# Patient Record
Sex: MALE | Race: OTHER | NOT HISPANIC OR LATINO | URBAN - METROPOLITAN AREA
[De-identification: names, ages, dates, MRNs, and addresses within clinical notes are randomized per-mention and may not be internally consistent; named-entity substitution may affect disease eponyms.]

---

## 2018-06-16 ENCOUNTER — EMERGENCY (EMERGENCY)
Facility: HOSPITAL | Age: 68
LOS: 1 days | Discharge: HOME | End: 2018-06-16
Attending: EMERGENCY MEDICINE | Admitting: EMERGENCY MEDICINE
Payer: MEDICARE

## 2018-06-16 VITALS
RESPIRATION RATE: 18 BRPM | TEMPERATURE: 98 F | SYSTOLIC BLOOD PRESSURE: 147 MMHG | OXYGEN SATURATION: 96 % | DIASTOLIC BLOOD PRESSURE: 106 MMHG | HEART RATE: 94 BPM

## 2018-06-16 LAB
ALBUMIN SERPL ELPH-MCNC: 4.5 G/DL — SIGNIFICANT CHANGE UP (ref 3.3–5)
ALP SERPL-CCNC: 71 U/L — SIGNIFICANT CHANGE UP (ref 40–120)
ALT FLD-CCNC: 17 U/L — SIGNIFICANT CHANGE UP (ref 4–41)
AST SERPL-CCNC: 19 U/L — SIGNIFICANT CHANGE UP (ref 4–40)
BASOPHILS # BLD AUTO: 0.03 K/UL — SIGNIFICANT CHANGE UP (ref 0–0.2)
BASOPHILS NFR BLD AUTO: 0.2 % — SIGNIFICANT CHANGE UP (ref 0–2)
BASOPHILS NFR SPEC: 0 % — SIGNIFICANT CHANGE UP (ref 0–2)
BILIRUB SERPL-MCNC: 0.8 MG/DL — SIGNIFICANT CHANGE UP (ref 0.2–1.2)
BUN SERPL-MCNC: 11 MG/DL — SIGNIFICANT CHANGE UP (ref 7–23)
CALCIUM SERPL-MCNC: 9.8 MG/DL — SIGNIFICANT CHANGE UP (ref 8.4–10.5)
CHLORIDE SERPL-SCNC: 87 MMOL/L — LOW (ref 98–107)
CO2 SERPL-SCNC: 27 MMOL/L — SIGNIFICANT CHANGE UP (ref 22–31)
CREAT SERPL-MCNC: 0.89 MG/DL — SIGNIFICANT CHANGE UP (ref 0.5–1.3)
EOSINOPHIL # BLD AUTO: 0.04 K/UL — SIGNIFICANT CHANGE UP (ref 0–0.5)
EOSINOPHIL NFR BLD AUTO: 0.3 % — SIGNIFICANT CHANGE UP (ref 0–6)
EOSINOPHIL NFR FLD: 0 % — SIGNIFICANT CHANGE UP (ref 0–6)
GIANT PLATELETS BLD QL SMEAR: PRESENT — SIGNIFICANT CHANGE UP
GLUCOSE SERPL-MCNC: 127 MG/DL — HIGH (ref 70–99)
HCT VFR BLD CALC: 42.4 % — SIGNIFICANT CHANGE UP (ref 39–50)
HGB BLD-MCNC: 14.7 G/DL — SIGNIFICANT CHANGE UP (ref 13–17)
HYPOCHROMIA BLD QL: SLIGHT — SIGNIFICANT CHANGE UP
IMM GRANULOCYTES # BLD AUTO: 0.05 # — SIGNIFICANT CHANGE UP
IMM GRANULOCYTES NFR BLD AUTO: 0.4 % — SIGNIFICANT CHANGE UP (ref 0–1.5)
LYMPHOCYTES # BLD AUTO: 1.35 K/UL — SIGNIFICANT CHANGE UP (ref 1–3.3)
LYMPHOCYTES # BLD AUTO: 10.4 % — LOW (ref 13–44)
LYMPHOCYTES NFR SPEC AUTO: 7.8 % — LOW (ref 13–44)
MCHC RBC-ENTMCNC: 30.6 PG — SIGNIFICANT CHANGE UP (ref 27–34)
MCHC RBC-ENTMCNC: 34.7 % — SIGNIFICANT CHANGE UP (ref 32–36)
MCV RBC AUTO: 88.3 FL — SIGNIFICANT CHANGE UP (ref 80–100)
MONOCYTES # BLD AUTO: 1.88 K/UL — HIGH (ref 0–0.9)
MONOCYTES NFR BLD AUTO: 14.5 % — HIGH (ref 2–14)
MONOCYTES NFR BLD: 6.1 % — SIGNIFICANT CHANGE UP (ref 2–9)
NEUTROPHIL AB SER-ACNC: 73.1 % — SIGNIFICANT CHANGE UP (ref 43–77)
NEUTROPHILS # BLD AUTO: 9.59 K/UL — HIGH (ref 1.8–7.4)
NEUTROPHILS NFR BLD AUTO: 74.2 % — SIGNIFICANT CHANGE UP (ref 43–77)
NEUTS BAND # BLD: 1.7 % — SIGNIFICANT CHANGE UP (ref 0–6)
NRBC # FLD: 0 — SIGNIFICANT CHANGE UP
PLATELET # BLD AUTO: 182 K/UL — SIGNIFICANT CHANGE UP (ref 150–400)
PLATELET COUNT - ESTIMATE: NORMAL — SIGNIFICANT CHANGE UP
PMV BLD: 9.7 FL — SIGNIFICANT CHANGE UP (ref 7–13)
POIKILOCYTOSIS BLD QL AUTO: SLIGHT — SIGNIFICANT CHANGE UP
POTASSIUM SERPL-MCNC: 3.5 MMOL/L — SIGNIFICANT CHANGE UP (ref 3.5–5.3)
POTASSIUM SERPL-SCNC: 3.5 MMOL/L — SIGNIFICANT CHANGE UP (ref 3.5–5.3)
PROT SERPL-MCNC: 8.4 G/DL — HIGH (ref 6–8.3)
RBC # BLD: 4.8 M/UL — SIGNIFICANT CHANGE UP (ref 4.2–5.8)
RBC # FLD: 12.1 % — SIGNIFICANT CHANGE UP (ref 10.3–14.5)
REVIEW TO FOLLOW: YES — SIGNIFICANT CHANGE UP
SODIUM SERPL-SCNC: 132 MMOL/L — LOW (ref 135–145)
VARIANT LYMPHS # BLD: 11.3 % — SIGNIFICANT CHANGE UP
WBC # BLD: 12.94 K/UL — HIGH (ref 3.8–10.5)
WBC # FLD AUTO: 12.94 K/UL — HIGH (ref 3.8–10.5)

## 2018-06-16 PROCEDURE — 70487 CT MAXILLOFACIAL W/DYE: CPT | Mod: 26

## 2018-06-16 PROCEDURE — 99283 EMERGENCY DEPT VISIT LOW MDM: CPT | Mod: 25

## 2018-06-16 RX ORDER — ACETAMINOPHEN 500 MG
650 TABLET ORAL ONCE
Qty: 0 | Refills: 0 | Status: COMPLETED | OUTPATIENT
Start: 2018-06-16 | End: 2018-06-16

## 2018-06-16 RX ADMIN — Medication 100 MILLIGRAM(S): at 20:41

## 2018-06-16 RX ADMIN — Medication 650 MILLIGRAM(S): at 20:41

## 2018-06-16 RX ADMIN — Medication 650 MILLIGRAM(S): at 18:13

## 2018-06-16 NOTE — ED PROVIDER NOTE - OBJECTIVE STATEMENT
68 y/o M with a PMHx of HDL and HTN presents to the ED complaining of R sided dental pain. Patient had his wisdom tooth removed 2 weeks ago and about 10 days ago patient says he felt a "nudge" and had trouble opening his mouth. Patient tried Tylenol with no significant improvement. Patient follow back with Dentist because he was afraid that a piece of tooth ws left in but findings were negative. Patient is currently managed on clindamycin 150 mg 4x a day.  Denies bleeding gums, fever, headaches or any other relating symptoms. 66 y/o M with a PMHx of HDL and HTN presents to the ED complaining of R sided dental pain. Patient had his wisdom tooth removed 5weeks ago and about 10 days ago patient says he felt a "nudge" and had trouble opening his mouth. Patient tried Tylenol with no significant improvement. Patient followed up with Dentist because he was afraid that a piece of tooth was left in but findings were negative. Patient is currently managed on clindamycin 150 mg 4x a day.  Denies bleeding gums, fever, headaches or any other relating symptoms. had chills, 2 days ago.

## 2018-06-16 NOTE — ED ADULT NURSE NOTE - OBJECTIVE STATEMENT
rec'd pt. a&ox3, with hx of HLD and HTN, came in c/o rt facial swelling and pain x few days s/p rt tooth extraction 5 weeks ago. pt. also reports intermittent fever for the past few days and was started Clinda at home. noted g20 saline lock on rt ac with no ss of infiltration. pt. in NAD, currently speaks clearly, no drooling noted, able to open mouth but with minimal pain at this time. pt. seen by MD and Dental Resident. awaits CT results. Clindamycin IVPB started as ordered. will continue to monitor

## 2018-06-16 NOTE — ED PROVIDER NOTE - PROGRESS NOTE DETAILS
TERRY Jones: Pt signed out to me by TERRY Raza - consulted OMFS for evaluation of abscess. MD CHO:  I received s/o on this pt from Dr. Bloch.  OMFS to drain oral abscess, cdu for iv abx.

## 2018-06-16 NOTE — PROGRESS NOTE ADULT - SUBJECTIVE AND OBJECTIVE BOX
Patient is a 67y old  Male who presents with a chief complaint of right facial swelling s/p extraction 5 weeks ago.    HPI: patient endorses having a maxillary right "wisdom tooth" extracted 5 weeks ago; patient endorses persistent pain and swelling; patient was prescribed clindamycin.      PAST MEDICAL & SURGICAL HISTORY:  Hypertension, unspecified type  No significant past surgical history      MEDICATIONS  (STANDING):    MEDICATIONS  (PRN):      Allergies    aspirin (Unknown)  penicillin (Unknown)    *SOCIAL HISTORY: presents with wife.    *Last Dental Visit:    Vital Signs Last 24 Hrs  T(C): 36.7 (16 Jun 2018 17:12), Max: 36.7 (16 Jun 2018 17:12)  T(F): 98.1 (16 Jun 2018 17:12), Max: 98.1 (16 Jun 2018 17:12)  HR: 94 (16 Jun 2018 17:12) (94 - 94)  BP: 147/106 (16 Jun 2018 17:12) (147/106 - 147/106)  BP(mean): --  RR: 18 (16 Jun 2018 17:12) (18 - 18)  SpO2: 96% (16 Jun 2018 17:12) (96% - 96%)    EOE:  TMJ (  - ) clicks                    (   - ) pops                    (   - ) crepitus             (  + ) trismus - patient able to open to 20mm, not guarding.             (  - ) LAD             (  + ) swelling - right buccal extending posteriorly to posterior aspect of ramus.             (  + ) asymmetry - right buccal extending posteriorly to posterior aspect of ramus.             (  + ) palpation - right buccal and right submandible.             (  - ) SOB             (  - ) dysphagia - patient able to eat and swallow food, difficulty is with opening.    IOE:  permanent dentition: multiple missing teeth           hard/soft palate:  (  - ) palatal torus - right soft palate minimally displaced.           tongue/FOM No pathology noted           labial/buccal mucosa - right buccal fullness    right sextant evaluated as follows:  1) no vestibular swelling on maxilla or mandible  2) no pain upon palpation of gingiva or percussion of dentition  3) maxillary right posterior alveolus healed  4) right buccal unable to appreciate salivary flow upon milking of parotid    LABS:                        14.7   12.94 )-----------( 182      ( 16 Jun 2018 18:11 )             42.4     06-16    132<L>  |  87<L>  |  11  ----------------------------<  127<H>  3.5   |  27  |  0.89    Ca    9.8      16 Jun 2018 18:11    TPro  8.4<H>  /  Alb  4.5  /  TBili  0.8  /  DBili  x   /  AST  19  /  ALT  17  /  AlkPhos  71  06-16    WBC Count: 12.94 K/uL <H> [3.8 - 10.5] (06-16 @ 18:11)  Platelet Count - Automated: 182 K/uL [150 - 400] (06-16 @ 18:11)      *DENTAL RADIOGRAPHS: panorex taken - asymmetric condyles; sinus floors appear intact and pneumatized; bilateral radiopacities suggestive of vascular calcifications; retained and carious #16; maxillary right without retention cyst or periapical radiolucency.     RADIOLOGY & ADDITIONAL STUDIES:     ASSESSMENT: suspicious for parotitis/ obstruction of stensons duct, recommending CT with contrast.    PROCEDURE:  Verbal consent given.   EOE.  IOE.  Radiograph.    RECOMMENDATIONS:  1) CT with contrast.  2) Dental F/U with outpatient dentist for comprehensive dental care.   3) If any difficulty swallowing/breathing, fever occur, page dental.    Pelon Hudson DDS; [id number].  Won Marroquin, DMD Patient is a 67y old  Male who presents with a chief complaint of right facial swelling s/p extraction 5 weeks ago.    HPI: patient endorses having a maxillary right "wisdom tooth" extracted 5 weeks ago; patient endorses persistent pain, swelling, and fever started this past Thursday ; patient was prescribed clindamycin 150mg 4Xs a day. Pt reports afebrile starting Friday.     PAST MEDICAL & SURGICAL HISTORY:  Hypertension, unspecified type  No significant past surgical history    MEDICATIONS  (STANDING): Clindamycin    MEDICATIONS  (PRN):    Allergies  aspirin (Unknown)  penicillin (Unknown)    *SOCIAL HISTORY: presents with wife.    *Last Dental Visit:    Vital Signs Last 24 Hrs  T(C): 36.7 (16 Jun 2018 17:12), Max: 36.7 (16 Jun 2018 17:12)  T(F): 98.1 (16 Jun 2018 17:12), Max: 98.1 (16 Jun 2018 17:12)  HR: 94 (16 Jun 2018 17:12) (94 - 94)  BP: 147/106 (16 Jun 2018 17:12) (147/106 - 147/106)  BP(mean): --  RR: 18 (16 Jun 2018 17:12) (18 - 18)  SpO2: 96% (16 Jun 2018 17:12) (96% - 96%)    EOE:  TMJ (  - ) clicks                    (   - ) pops                    (   - ) crepitus             (  + ) trismus - patient able to open to 20mm, not guarding.             (  - ) LAD             (  + ) swelling - right buccal extending posteriorly to posterior aspect of ramus.             (  + ) asymmetry - right buccal extending posteriorly to posterior aspect of ramus.             (  + ) palpation - Tenderness  to right buccal and right submandible area.             (  - ) SOB             (  - ) dysphagia - patient able to eat and swallow food, difficulty is with opening.    IOE:  permanent dentition: multiple missing teeth           hard/soft palate:  (  - ) palatal torus - right soft palate minimally displaced.           tongue/FOM No pathology noted           labial/buccal mucosa - right buccal fullness    right sextant evaluated as follows:  1) no vestibular swelling on maxilla or mandible  2) no pain upon palpation of gingiva or percussion of dentition  3) maxillary right posterior alveolus healed  4) right buccal unable to appreciate salivary flow upon milking of parotid    LABS:                        14.7   12.94 )-----------( 182      ( 16 Jun 2018 18:11 )             42.4     06-16    132<L>  |  87<L>  |  11  ----------------------------<  127<H>  3.5   |  27  |  0.89    Ca    9.8      16 Jun 2018 18:11    TPro  8.4<H>  /  Alb  4.5  /  TBili  0.8  /  DBili  x   /  AST  19  /  ALT  17  /  AlkPhos  71  06-16    WBC Count: 12.94 K/uL <H> [3.8 - 10.5] (06-16 @ 18:11)  Platelet Count - Automated: 182 K/uL [150 - 400] (06-16 @ 18:11)      *DENTAL RADIOGRAPHS: panorex taken - asymmetric condyles; sinus floors appear intact and pneumatized; bilateral radiopacities suggestive of vascular calcifications; retained and carious #16; maxillary right without retention cyst or periapical radiolucency.     ASSESSMENT: suspicious for parotitis/ obstruction of stensons duct, recommending CT with contrast.    PROCEDURE:  Verbal consent given.   EOE.  IOE.  Radiograph and interpreted    RECOMMENDATIONS:  1) CT with contrast.  2) Dental F/U with outpatient dentist for comprehensive dental care.   3) If any difficulty swallowing/breathing, fever occur, page dental.    Pelon Hudson, MARLIN; [id number].  Won Marroquin, DMD Patient is a 67y old  Male who presents with a chief complaint of right facial swelling s/p extraction 5 weeks ago.    HPI: patient endorses having a maxillary right "wisdom tooth" extracted 5 weeks ago; patient endorses persistent pain, swelling, and fever which started this past Thursday; patient was prescribed clindamycin 150mg 4Xs a day. Pt reports being afebrile starting Friday.     PAST MEDICAL & SURGICAL HISTORY:  Hypertension, unspecified type  HLD  No significant past surgical history    MEDICATIONS  (STANDING): Clindamycin  HTN and HLD medications    Allergies  aspirin (Unknown)  penicillin (Unknown)    *SOCIAL HISTORY: presents with wife.    Vital Signs Last 24 Hrs  T(C): 36.7 (16 Jun 2018 17:12), Max: 36.7 (16 Jun 2018 17:12)  T(F): 98.1 (16 Jun 2018 17:12), Max: 98.1 (16 Jun 2018 17:12)  HR: 94 (16 Jun 2018 17:12) (94 - 94)  BP: 147/106 (16 Jun 2018 17:12) (147/106 - 147/106)  BP(mean): --  RR: 18 (16 Jun 2018 17:12) (18 - 18)  SpO2: 96% (16 Jun 2018 17:12) (96% - 96%)    EOE:  TMJ (  - ) clicks                    (   - ) pops                    (   - ) crepitus             (  + ) trismus - patient able to open to 20mm, not guarding.             (  - ) LAD             (  + ) swelling - right buccal extending posteriorly to posterior aspect of ramus.             (  + ) asymmetry - right buccal extending posteriorly to posterior aspect of ramus.             (  + ) palpation - tenderness  to right buccal and right submandibular area.             (  - ) SOB             (  - ) dysphagia - patient able to eat and swallow food, difficulty is with opening.    IOE:  permanent dentition: multiple missing teeth           hard/soft palate:  (  - ) palatal torus - right soft palate minimally displaced.           tongue/FOM No pathology noted           labial/buccal mucosa - right buccal fullness    right sextant evaluated as follows:  1) no vestibular swelling on maxilla or mandible  2) no pain upon palpation of gingiva or percussion of dentition  3) maxillary right posterior alveolus healed  4) right buccal unable to appreciate salivary flow upon milking of parotid    LABS:                        14.7   12.94 )-----------( 182      ( 16 Jun 2018 18:11 )             42.4     06-16    132<L>  |  87<L>  |  11  ----------------------------<  127<H>  3.5   |  27  |  0.89    Ca    9.8      16 Jun 2018 18:11    TPro  8.4<H>  /  Alb  4.5  /  TBili  0.8  /  DBili  x   /  AST  19  /  ALT  17  /  AlkPhos  71  06-16    WBC Count: 12.94 K/uL <H> [3.8 - 10.5] (06-16 @ 18:11)  Platelet Count - Automated: 182 K/uL [150 - 400] (06-16 @ 18:11)      *DENTAL RADIOGRAPHS: panorex taken - asymmetric condyles; sinus floors appear intact and pneumatized; bilateral radiopacities suggestive of vascular calcifications; retained and carious #16; maxillary right without retention cyst or periapical radiolucency, but retained root noted.    ASSESSMENT: initially suspicious for parotitis/ obstruction of stensons duct, recommended CT with contrast.  CT with contrast reveals infratemporal abscess associated with retained root.        PROCEDURE:  Verbal consent given.   EOE.  IOE.  Radiograph and interpreted    RECOMMENDATIONS:  1) CT with contrast.  2) Dental F/U with outpatient dentist for comprehensive dental care.   3) If any difficulty swallowing/breathing, fever occur, page dental.    Pelon Hudson DDS; [id number].  Won Marroquin, PERRI Patient is a 67y old  Male who presents with a chief complaint of right facial swelling s/p extraction 5 weeks ago.    HPI: patient endorses having a maxillary right "wisdom tooth" extracted 5 weeks ago; patient endorses persistent pain, swelling, and fever which started this past Thursday; patient was prescribed clindamycin 150mg 4Xs a day. Pt reports being afebrile starting Friday.     PAST MEDICAL & SURGICAL HISTORY:  Hypertension, unspecified type  HLD  No significant past surgical history    MEDICATIONS  (STANDING): Clindamycin  HTN and HLD medications    Allergies  aspirin (Unknown)  penicillin (Unknown)    *SOCIAL HISTORY: presents with wife.    Vital Signs Last 24 Hrs  T(C): 36.7 (16 Jun 2018 17:12), Max: 36.7 (16 Jun 2018 17:12)  T(F): 98.1 (16 Jun 2018 17:12), Max: 98.1 (16 Jun 2018 17:12)  HR: 94 (16 Jun 2018 17:12) (94 - 94)  BP: 147/106 (16 Jun 2018 17:12) (147/106 - 147/106)  BP(mean): --  RR: 18 (16 Jun 2018 17:12) (18 - 18)  SpO2: 96% (16 Jun 2018 17:12) (96% - 96%)    EOE:  TMJ (  - ) clicks                    (   - ) pops                    (   - ) crepitus             (  + ) trismus - patient able to open to 20mm, not guarding.             (  - ) LAD             (  + ) swelling - right buccal extending posteriorly to posterior aspect of ramus.             (  + ) asymmetry - right buccal extending posteriorly to posterior aspect of ramus.             (  + ) palpation - tenderness  to right buccal and right submandibular area.             (  - ) SOB             (  - ) dysphagia - patient able to eat and swallow food, difficulty is with opening.    IOE:  permanent dentition: multiple missing teeth           hard/soft palate:  (  - ) palatal torus - right soft palate minimally displaced.           tongue/FOM No pathology noted           labial/buccal mucosa - right buccal fullness    right sextant evaluated as follows:  1) no vestibular swelling on maxilla or mandible  2) no pain upon palpation of gingiva or percussion of dentition  3) maxillary right posterior alveolus healed  4) right buccal unable to appreciate salivary flow upon milking of parotid    LABS:                        14.7   12.94 )-----------( 182      ( 16 Jun 2018 18:11 )             42.4     06-16    132<L>  |  87<L>  |  11  ----------------------------<  127<H>  3.5   |  27  |  0.89    Ca    9.8      16 Jun 2018 18:11    TPro  8.4<H>  /  Alb  4.5  /  TBili  0.8  /  DBili  x   /  AST  19  /  ALT  17  /  AlkPhos  71  06-16    WBC Count: 12.94 K/uL <H> [3.8 - 10.5] (06-16 @ 18:11)  Platelet Count - Automated: 182 K/uL [150 - 400] (06-16 @ 18:11)      *DENTAL RADIOGRAPHS: panorex taken - asymmetric condyles; sinus floors appear intact and pneumatized; bilateral radiopacities suggestive of vascular calcifications; retained and carious #16; maxillary right without retention cyst or periapical radiolucency, but retained root noted.    CT with contrast:  IMPRESSION:   Multiloculated abscess in the right centered in the right lateral   pterygoid muscle measuring approximately 3.2 x 1.8 x 2.2 cm.  Thickening   of the right masseter and right medial pterygoid muscles compatible with   spread of infection.  Free fluid and inflammatory change tracking into   the right mandibular space.  Overlying cellulitis in the right cheek.    Diffuse paranasal sinus disease.  Near complete opacification of the left   maxillary sinus.  Acute sinusitis should be excluded clinically.    ASSESSMENT: initially suspicious for parotitis/ obstruction of stensons duct, recommended CT with contrast.  CT with contrast reveals abscess associated with maxillary right retained root.    PROCEDURE:  Verbal consent given.   EOE.  IOE.  Radiograph and interpreted.    RECOMMENDATIONS:  1) OMFS consulted.  2) If any difficulty swallowing/breathing, fever occur, page dental or OMFS.    Pelon Hudson DDS; [id number].  Won Marroquin, DMD Patient is a 67y old  Male who presents with a chief complaint of right facial swelling s/p extraction 5 weeks ago.    HPI: patient endorses having a maxillary right "wisdom tooth" extracted 5 weeks ago; patient endorses persistent pain, swelling, and fever which started this past Thursday; patient was prescribed clindamycin 150mg 4Xs a day. Pt reports being afebrile starting Friday.     PAST MEDICAL & SURGICAL HISTORY:  Hypertension, unspecified type  HLD  No significant past surgical history    MEDICATIONS  (STANDING): Clindamycin  HTN and HLD medications    Allergies  aspirin (Unknown)  penicillin (Unknown)    *SOCIAL HISTORY: presents with wife.    Vital Signs Last 24 Hrs  T(C): 36.7 (16 Jun 2018 17:12), Max: 36.7 (16 Jun 2018 17:12)  T(F): 98.1 (16 Jun 2018 17:12), Max: 98.1 (16 Jun 2018 17:12)  HR: 94 (16 Jun 2018 17:12) (94 - 94)  BP: 147/106 (16 Jun 2018 17:12) (147/106 - 147/106)  BP(mean): --  RR: 18 (16 Jun 2018 17:12) (18 - 18)  SpO2: 96% (16 Jun 2018 17:12) (96% - 96%)    EOE:  TMJ (  - ) clicks                    (   - ) pops                    (   - ) crepitus             (  + ) trismus - patient able to open to 20mm, not guarding.             (  - ) LAD             (  + ) swelling - right buccal extending posteriorly to posterior aspect of ramus.             (  + ) asymmetry - right buccal extending posteriorly to posterior aspect of ramus.             (  + ) palpation - tenderness  to right buccal and right submandibular area.             (  - ) SOB             (  - ) dysphagia - patient able to eat and swallow food, difficulty is with opening.    IOE:  permanent dentition: multiple missing teeth           hard/soft palate:  (  - ) palatal torus - right soft palate minimally displaced.           tongue/FOM No pathology noted           labial/buccal mucosa - right buccal fullness    right sextant evaluated as follows:  1) no vestibular swelling on maxilla or mandible  2) no pain upon palpation of gingiva or percussion of dentition  3) maxillary right posterior alveolus healed  4) right buccal unable to appreciate salivary flow upon milking of parotid    LABS:                        14.7   12.94 )-----------( 182      ( 16 Jun 2018 18:11 )             42.4     06-16    132<L>  |  87<L>  |  11  ----------------------------<  127<H>  3.5   |  27  |  0.89    Ca    9.8      16 Jun 2018 18:11    TPro  8.4<H>  /  Alb  4.5  /  TBili  0.8  /  DBili  x   /  AST  19  /  ALT  17  /  AlkPhos  71  06-16    WBC Count: 12.94 K/uL <H> [3.8 - 10.5] (06-16 @ 18:11)  Platelet Count - Automated: 182 K/uL [150 - 400] (06-16 @ 18:11)      *DENTAL RADIOGRAPHS: panorex taken - asymmetric condyles; sinus floors appear intact and pneumatized; bilateral radiopacities suggestive of vascular calcifications; retained and carious #16; maxillary right without retention cyst or periapical radiolucency, but retained root noted.    CT with contrast:  IMPRESSION:   Multiloculated abscess in the right centered in the right lateral   pterygoid muscle measuring approximately 3.2 x 1.8 x 2.2 cm.  Thickening   of the right masseter and right medial pterygoid muscles compatible with   spread of infection.  Free fluid and inflammatory change tracking into   the right mandibular space.  Overlying cellulitis in the right cheek.    Diffuse paranasal sinus disease.  Near complete opacification of the left   maxillary sinus.  Acute sinusitis should be excluded clinically.    ASSESSMENT: initially suspicious for parotitis/ obstruction of stensons duct, recommended CT with contrast.  CT with contrast reveals abscess associated with maxillary right retained root.    PROCEDURE:  Verbal consent given.   EOE.  IOE.  Radiograph and interpreted.    RECOMMENDATIONS:  1) OMFS consulted.  2) If any difficulty swallowing/breathing, fever occur, page dental or OMFS.    Pelon Hudson DDS; [id number].  Won Marroquin, DMD Patient is a 67y old  Male who presents with a chief complaint of right facial swelling s/p extraction 5 weeks ago.    HPI: patient endorses having a maxillary right "wisdom tooth" extracted 5 weeks ago; patient endorses persistent pain, swelling, and fever which started this past Thursday; patient was prescribed clindamycin 150mg 4Xs a day. Pt reports being afebrile starting Friday.     PAST MEDICAL & SURGICAL HISTORY:  Hypertension, unspecified type  HLD  No significant past surgical history    MEDICATIONS  (STANDING): Clindamycin  HTN and HLD medications    Allergies  aspirin (Unknown)  penicillin (Unknown)    *SOCIAL HISTORY: presents with wife.    Vital Signs Last 24 Hrs  T(C): 36.7 (16 Jun 2018 17:12), Max: 36.7 (16 Jun 2018 17:12)  T(F): 98.1 (16 Jun 2018 17:12), Max: 98.1 (16 Jun 2018 17:12)  HR: 94 (16 Jun 2018 17:12) (94 - 94)  BP: 147/106 (16 Jun 2018 17:12) (147/106 - 147/106)  BP(mean): --  RR: 18 (16 Jun 2018 17:12) (18 - 18)  SpO2: 96% (16 Jun 2018 17:12) (96% - 96%)    EOE:  TMJ (  - ) clicks                    (   - ) pops                    (   - ) crepitus             (  + ) trismus - patient able to open to 20mm, not guarding.             (  - ) LAD             (  + ) swelling - right buccal extending posteriorly to posterior aspect of ramus.             (  + ) asymmetry - right buccal extending posteriorly to posterior aspect of ramus.             (  + ) palpation - tenderness  to right buccal and right submandibular area.             (  - ) SOB             (  - ) dysphagia - patient able to eat and swallow food, difficulty is with opening.    IOE:  permanent dentition: multiple missing teeth           hard/soft palate:  (  - ) palatal torus - right soft palate minimally displaced.           tongue/FOM No pathology noted           labial/buccal mucosa - right buccal fullness    right sextant evaluated as follows:  1) no vestibular swelling on maxilla or mandible  2) no pain upon palpation of gingiva or percussion of dentition  3) maxillary right posterior alveolus healed  4) right buccal unable to appreciate salivary flow upon milking of parotid    LABS:                        14.7   12.94 )-----------( 182      ( 16 Jun 2018 18:11 )             42.4     06-16    132<L>  |  87<L>  |  11  ----------------------------<  127<H>  3.5   |  27  |  0.89    Ca    9.8      16 Jun 2018 18:11    TPro  8.4<H>  /  Alb  4.5  /  TBili  0.8  /  DBili  x   /  AST  19  /  ALT  17  /  AlkPhos  71  06-16    WBC Count: 12.94 K/uL <H> [3.8 - 10.5] (06-16 @ 18:11)  Platelet Count - Automated: 182 K/uL [150 - 400] (06-16 @ 18:11)      *DENTAL RADIOGRAPHS: panorex taken - asymmetric condyles; sinus floors appear intact and pneumatized; bilateral radiopacities suggestive of vascular calcifications; retained and carious #16; maxillary right without retention cyst or periapical radiolucency, but retained root noted.    CT with contrast:  IMPRESSION:   Multiloculated abscess in the right centered in the right lateral   pterygoid muscle measuring approximately 3.2 x 1.8 x 2.2 cm.  Thickening   of the right masseter and right medial pterygoid muscles compatible with   spread of infection.  Free fluid and inflammatory change tracking into   the right mandibular space.  Overlying cellulitis in the right cheek.    Diffuse paranasal sinus disease.  Near complete opacification of the left   maxillary sinus.  Acute sinusitis should be excluded clinically.    ASSESSMENT: initially suspicious for parotitis/ obstruction of stensons duct, recommended CT with contrast.    abscess possibly associated with maxillary right retained root.    PROCEDURE:  Verbal consent given.   EOE.  IOE.  Radiograph and interpreted.    RECOMMENDATIONS:  1) OMFS consulted.  2) If any difficulty swallowing/breathing, fever occur, page dental or OMFS.    Pelon Hudson DDS; [id number].  Won Marroquin, PERRI

## 2018-06-17 VITALS
OXYGEN SATURATION: 97 % | HEART RATE: 78 BPM | TEMPERATURE: 99 F | RESPIRATION RATE: 18 BRPM | DIASTOLIC BLOOD PRESSURE: 88 MMHG | SYSTOLIC BLOOD PRESSURE: 123 MMHG

## 2018-06-17 LAB
ALBUMIN SERPL ELPH-MCNC: 4 G/DL — SIGNIFICANT CHANGE UP (ref 3.3–5)
ALP SERPL-CCNC: 63 U/L — SIGNIFICANT CHANGE UP (ref 40–120)
ALT FLD-CCNC: 12 U/L — SIGNIFICANT CHANGE UP (ref 4–41)
AST SERPL-CCNC: 16 U/L — SIGNIFICANT CHANGE UP (ref 4–40)
BASOPHILS # BLD AUTO: 0.02 K/UL — SIGNIFICANT CHANGE UP (ref 0–0.2)
BASOPHILS NFR BLD AUTO: 0.2 % — SIGNIFICANT CHANGE UP (ref 0–2)
BILIRUB SERPL-MCNC: 0.7 MG/DL — SIGNIFICANT CHANGE UP (ref 0.2–1.2)
BUN SERPL-MCNC: 11 MG/DL — SIGNIFICANT CHANGE UP (ref 7–23)
CALCIUM SERPL-MCNC: 8.7 MG/DL — SIGNIFICANT CHANGE UP (ref 8.4–10.5)
CHLORIDE SERPL-SCNC: 92 MMOL/L — LOW (ref 98–107)
CO2 SERPL-SCNC: 23 MMOL/L — SIGNIFICANT CHANGE UP (ref 22–31)
CREAT SERPL-MCNC: 0.82 MG/DL — SIGNIFICANT CHANGE UP (ref 0.5–1.3)
EOSINOPHIL # BLD AUTO: 0.01 K/UL — SIGNIFICANT CHANGE UP (ref 0–0.5)
EOSINOPHIL NFR BLD AUTO: 0.1 % — SIGNIFICANT CHANGE UP (ref 0–6)
GLUCOSE SERPL-MCNC: 118 MG/DL — HIGH (ref 70–99)
GRAM STN SPEC: SIGNIFICANT CHANGE UP
GRAM STN SPEC: SIGNIFICANT CHANGE UP
HCT VFR BLD CALC: 41.4 % — SIGNIFICANT CHANGE UP (ref 39–50)
HGB BLD-MCNC: 13.9 G/DL — SIGNIFICANT CHANGE UP (ref 13–17)
IMM GRANULOCYTES # BLD AUTO: 0.04 # — SIGNIFICANT CHANGE UP
IMM GRANULOCYTES NFR BLD AUTO: 0.3 % — SIGNIFICANT CHANGE UP (ref 0–1.5)
LYMPHOCYTES # BLD AUTO: 0.84 K/UL — LOW (ref 1–3.3)
LYMPHOCYTES # BLD AUTO: 6.8 % — LOW (ref 13–44)
MCHC RBC-ENTMCNC: 30.8 PG — SIGNIFICANT CHANGE UP (ref 27–34)
MCHC RBC-ENTMCNC: 33.6 % — SIGNIFICANT CHANGE UP (ref 32–36)
MCV RBC AUTO: 91.6 FL — SIGNIFICANT CHANGE UP (ref 80–100)
MONOCYTES # BLD AUTO: 1.67 K/UL — HIGH (ref 0–0.9)
MONOCYTES NFR BLD AUTO: 13.5 % — SIGNIFICANT CHANGE UP (ref 2–14)
NEUTROPHILS # BLD AUTO: 9.77 K/UL — HIGH (ref 1.8–7.4)
NEUTROPHILS NFR BLD AUTO: 79.1 % — HIGH (ref 43–77)
NRBC # FLD: 0 — SIGNIFICANT CHANGE UP
PLATELET # BLD AUTO: 175 K/UL — SIGNIFICANT CHANGE UP (ref 150–400)
PMV BLD: 10.4 FL — SIGNIFICANT CHANGE UP (ref 7–13)
POTASSIUM SERPL-MCNC: 3.4 MMOL/L — LOW (ref 3.5–5.3)
POTASSIUM SERPL-SCNC: 3.4 MMOL/L — LOW (ref 3.5–5.3)
PROT SERPL-MCNC: 7.6 G/DL — SIGNIFICANT CHANGE UP (ref 6–8.3)
RBC # BLD: 4.52 M/UL — SIGNIFICANT CHANGE UP (ref 4.2–5.8)
RBC # FLD: 12.2 % — SIGNIFICANT CHANGE UP (ref 10.3–14.5)
SODIUM SERPL-SCNC: 132 MMOL/L — LOW (ref 135–145)
SPECIMEN SOURCE: SIGNIFICANT CHANGE UP
SPECIMEN SOURCE: SIGNIFICANT CHANGE UP
WBC # BLD: 12.35 K/UL — HIGH (ref 3.8–10.5)
WBC # FLD AUTO: 12.35 K/UL — HIGH (ref 3.8–10.5)

## 2018-06-17 PROCEDURE — 99235 HOSP IP/OBS SAME DATE MOD 70: CPT

## 2018-06-17 RX ORDER — AMLODIPINE BESYLATE 2.5 MG/1
5 TABLET ORAL ONCE
Qty: 0 | Refills: 0 | Status: COMPLETED | OUTPATIENT
Start: 2018-06-17 | End: 2018-06-17

## 2018-06-17 RX ORDER — HYDROCHLOROTHIAZIDE 25 MG
12.5 TABLET ORAL ONCE
Qty: 0 | Refills: 0 | Status: COMPLETED | OUTPATIENT
Start: 2018-06-17 | End: 2018-06-17

## 2018-06-17 RX ORDER — SODIUM CHLORIDE 9 MG/ML
1000 INJECTION INTRAMUSCULAR; INTRAVENOUS; SUBCUTANEOUS
Qty: 0 | Refills: 0 | Status: DISCONTINUED | OUTPATIENT
Start: 2018-06-17 | End: 2018-06-20

## 2018-06-17 RX ORDER — ACETAMINOPHEN 500 MG
650 TABLET ORAL ONCE
Qty: 0 | Refills: 0 | Status: COMPLETED | OUTPATIENT
Start: 2018-06-17 | End: 2018-06-17

## 2018-06-17 RX ORDER — ACETAMINOPHEN 500 MG
325 TABLET ORAL ONCE
Qty: 0 | Refills: 0 | Status: COMPLETED | OUTPATIENT
Start: 2018-06-17 | End: 2018-06-17

## 2018-06-17 RX ORDER — SODIUM CHLORIDE 9 MG/ML
1000 INJECTION INTRAMUSCULAR; INTRAVENOUS; SUBCUTANEOUS ONCE
Qty: 0 | Refills: 0 | Status: COMPLETED | OUTPATIENT
Start: 2018-06-17 | End: 2018-06-17

## 2018-06-17 RX ORDER — ATORVASTATIN CALCIUM 80 MG/1
80 TABLET, FILM COATED ORAL ONCE
Qty: 0 | Refills: 0 | Status: DISCONTINUED | OUTPATIENT
Start: 2018-06-17 | End: 2018-06-20

## 2018-06-17 RX ORDER — ACETAMINOPHEN 500 MG
975 TABLET ORAL ONCE
Qty: 0 | Refills: 0 | Status: COMPLETED | OUTPATIENT
Start: 2018-06-17 | End: 2018-06-17

## 2018-06-17 RX ORDER — VALSARTAN 80 MG/1
160 TABLET ORAL ONCE
Qty: 0 | Refills: 0 | Status: COMPLETED | OUTPATIENT
Start: 2018-06-17 | End: 2018-06-17

## 2018-06-17 RX ORDER — AMLODIPINE BESYLATE 2.5 MG/1
5 TABLET ORAL ONCE
Qty: 0 | Refills: 0 | Status: DISCONTINUED | OUTPATIENT
Start: 2018-06-17 | End: 2018-06-17

## 2018-06-17 RX ORDER — ACETAMINOPHEN 500 MG
975 TABLET ORAL ONCE
Qty: 0 | Refills: 0 | Status: DISCONTINUED | OUTPATIENT
Start: 2018-06-17 | End: 2018-06-20

## 2018-06-17 RX ADMIN — Medication 650 MILLIGRAM(S): at 01:44

## 2018-06-17 RX ADMIN — Medication 12.5 MILLIGRAM(S): at 17:28

## 2018-06-17 RX ADMIN — Medication 975 MILLIGRAM(S): at 09:29

## 2018-06-17 RX ADMIN — SODIUM CHLORIDE 100 MILLILITER(S): 9 INJECTION INTRAMUSCULAR; INTRAVENOUS; SUBCUTANEOUS at 10:00

## 2018-06-17 RX ADMIN — SODIUM CHLORIDE 1000 MILLILITER(S): 9 INJECTION INTRAMUSCULAR; INTRAVENOUS; SUBCUTANEOUS at 01:48

## 2018-06-17 RX ADMIN — Medication 100 MILLIGRAM(S): at 20:30

## 2018-06-17 RX ADMIN — Medication 325 MILLIGRAM(S): at 06:43

## 2018-06-17 RX ADMIN — Medication 12.5 MILLIGRAM(S): at 13:30

## 2018-06-17 RX ADMIN — Medication 100 MILLIGRAM(S): at 13:07

## 2018-06-17 RX ADMIN — Medication 100 MILLIGRAM(S): at 04:43

## 2018-06-17 RX ADMIN — Medication 325 MILLIGRAM(S): at 03:43

## 2018-06-17 RX ADMIN — Medication 975 MILLIGRAM(S): at 10:20

## 2018-06-17 RX ADMIN — Medication 650 MILLIGRAM(S): at 19:02

## 2018-06-17 RX ADMIN — VALSARTAN 160 MILLIGRAM(S): 80 TABLET ORAL at 10:46

## 2018-06-17 RX ADMIN — AMLODIPINE BESYLATE 5 MILLIGRAM(S): 2.5 TABLET ORAL at 10:47

## 2018-06-17 RX ADMIN — SODIUM CHLORIDE 100 MILLILITER(S): 9 INJECTION INTRAMUSCULAR; INTRAVENOUS; SUBCUTANEOUS at 03:46

## 2018-06-17 NOTE — ED CDU PROVIDER INITIAL DAY NOTE - ATTENDING CONTRIBUTION TO CARE
Dr Bloch, ATTENDING MD-  I performed a face to face bedside interview with patient regarding history of present illness, review of symptoms and past medical history. I completed an independent physical exam.  I have discussed patient's plan of care with PA.   Documentation as above in the note.  Patient well appearing, HEENT swelling right side of face, and inside buccal mucosa, some trismus but able to see pharynx. no erythema. afebrile, heart sounds nml lungs clear abd soft , neuro nonfocal.

## 2018-06-17 NOTE — ED CDU PROVIDER INITIAL DAY NOTE - OBJECTIVE STATEMENT
67 y.o male pmhx of HTN here with worsening R sided facial swelling and pain over the past 5 weeks since he got his R wisdom tooth extracted. States over the last 2 weeks or so pain has become worse, thursday so bad prompted him to go to his dentist also felt feverish who gave him clinda- took one dose thursday night and 150mg x4 on friday- states could barely open his mouth on saturday was advised by doctor to go to the ED. In the ED , pt had CT and I&D performed by OMFS with drain placed who rec cdu for iv abx. Pt admits to feeling much better.

## 2018-06-17 NOTE — ED CDU PROVIDER DISPOSITION NOTE - ATTENDING CONTRIBUTION TO CARE
Locurto  pt in CDU for dental infection requiring drainage  Pt reports decreased swelling and pain   still with some swelling over rt maxilla  mild tenderness normal respirations  no abnormal upper airway sounds  pulse regular rate rhythm  pt ambulatory and ready for discharge

## 2018-06-17 NOTE — PROGRESS NOTE ADULT - ASSESSMENT
A/P: 67y Male with history of HTN and HLD with right lateral pterygoid abscess due to retained root tip #1 POD1 s/p intraoral I&D and drain placement.  - Patient should stay in CDU for 2 more doses of IV clindamycin (900mg)  - Okay for regular diet  - Care as per CDU  - OMFS will re-evaluate patient at 8PM, pending discharge

## 2018-06-17 NOTE — PROGRESS NOTE ADULT - SUBJECTIVE AND OBJECTIVE BOX
HPI:  67y Male with history of HTN and HLD presents to [facility] for right sided facial swelling. Patient reports that he had extraction of upper right impacted wisdom tooth about 5 weeks ago. Post-extraction, he was given antibiotics. However, 2 weeks post-extraction, patient experienced increased pain and progressive swelling. Swelling and pain worsened severely about 4 days ago. He presented today with 6/10 pain, worsened with palpation. Patient denies f/c/n/v, odynophagia or dysphagia.    Vital Signs Last 24 Hrs  T(C): 36.8 (17 Jun 2018 09:59), Max: 38.3 (17 Jun 2018 02:40)  T(F): 98.2 (17 Jun 2018 09:59), Max: 101 (17 Jun 2018 02:40)  HR: 87 (17 Jun 2018 09:59) (67 - 98)  BP: 148/90 (17 Jun 2018 09:59) (130/95 - 148/90)  BP(mean): --  RR: 18 (17 Jun 2018 09:59) (17 - 18)  SpO2: 96% (17 Jun 2018 09:59) (95% - 100%)    PE:   Gen: AAOx3, NAD  EOE: right posterior maxillary swelling overlying right parotid, no LAD  IOE: Occlusion stable and reproducible, generalized good OH, no teeth with obvious gross caries, upper right root tip not appreciated, right posterior maxillary vestibular incision hemostatic, penrose drain in place, no purulence on palpation  GIO: 25mm                        13.9   12.35 )-----------( 175      ( 17 Jun 2018 06:30 )             41.4     06-17    132<L>  |  92<L>  |  11  ----------------------------<  118<H>  3.4<L>   |  23  |  0.82    Ca    8.7      17 Jun 2018 06:30    TPro  7.6  /  Alb  4.0  /  TBili  0.7  /  DBili  x   /  AST  16  /  ALT  12  /  AlkPhos  63  06-17    I&O's Summary    Gram Stain Result:   WBC^White Blood Cells  QNTY CELLS IN GRAM STAIN: MODERATE (3+)  GPR^Gram Positive Rods  QUANTITY OF BACTERIA SEEN: MODERATE (3+)  GPCPR^Gram Pos Cocci in Pairs  QUANTITY OF BACTERIA SEEN: MODERATE (3+)  GNR^Gram Neg Rods  QUANTITY OF BACTERIA SEEN:RARE (1+) (06-17-18 @ 02:20)  Gram Stain Result:   WBC^White Blood Cells  QNTY CELLS IN GRAM STAIN: FEW (2+)  GNCB^Gram Negative Coccobacilli  QUANTITY OF BACTERIA SEEN: RARE (1+) (06-17-18 @ 02:20)

## 2018-06-17 NOTE — ED CDU PROVIDER INITIAL DAY NOTE - MEDICAL DECISION MAKING DETAILS
67 y.o male pmhx HTN s/p I&D of dental abscess- CDU for IV abx. 67 y.o male pmhx HTN s/p I&D ofright lateral pterygoid space abscess with penrose drain placement - CDU for IV abx.

## 2018-06-17 NOTE — ED CDU PROVIDER INITIAL DAY NOTE - PHYSICAL EXAMINATION
s/p I&D with R sided buccal drain in place  no tenderness, no facial erythema   uvula midline, tolerating airway s/p I&D with R sided buccal drain in place  R sided facial swelling ( improved as per patient)  no tenderness to palpation, no facial erythema   uvula midline, tolerating airway

## 2018-06-17 NOTE — ED CDU PROVIDER INITIAL DAY NOTE - PROGRESS NOTE DETAILS
pt complained of chills, rectally 101. spoke to OMFS who state expected this response after the I&D and will round on patient this morning, continue tylenol and clinda. Pt tolerating airway, no acute distress, will continue to monitor. pt refusing any other pain control medications at this time. PT admits to feeling much better this morning. well appearing. tolerating po intake. will continue to monitor and sign out to morning team for OMFS re-eval. TERRY Damon: Paient feeling better and tolerating PO well. OMFS re-eval and possible D/C on PO ABX if OMFS feels D/C is appropriate. Patient noted 5/10 Pain, denies fever or other symptoms, states tylenol helped previously denies H/O liver issues. OMFS reccomended 2 more doses of clindamycin with increased dose to 900, [place] [facility] [place] feeling much better after CDU stay. Pt seen and eval by OMFS again and they are comfortable with dc on clinda w/ f/u in clinic tomorrow for drain removal. Pt given strict return precautions. Stable for dc.

## 2018-06-17 NOTE — ED CDU PROVIDER INITIAL DAY NOTE - DETAILS
67 y.o male pmhx HTN s/p I&D of dental abscess- CDU for IV abx. 67 y.o male pmhx HTN s/p I&D of abscess- CDU for IV abx. 67 y.o male pmhx HTN s/p I&D ofright lateral pterygoid space abscess with penrose drain placement - CDU for IV abx.

## 2018-06-17 NOTE — CONSULT NOTE ADULT - ASSESSMENT
A/P:  67y Male with history of HTN and HLD presents to [facility] for right sided facial swelling. CT scan and panoramic radiograph confirms presence of abscess in space of right lateral pterygoid muscle due to retained root tip in the upper right molar region. Case discussed with Dr. Urbina and Dr. Mora, who recommends I&D of right lateral pterygoid abscess with penrose drain placement.    Procedure: I&D of right lateral pterygoid space abscess with penrose drain placement  1. Surgical consent explained, obtained and in chart  2. Administered 4cc of 3% mepivicaine without epinephrine for right PSA nerve block and local infiltrations  3. Vestibular incision made with 15-blade  4. Blunt dissection subperiostally completed. Right maxillary buttress identified. Flocculations broken up with curved hemostat. About 3-5 cc of ninoska purulent drainage obtained from dissection. Abscess cultured and sent for microbiology testing.  5. Abscess irrigated copiously with peridex and sterile saline  6. Penrose drain placed in abscess cavity and secured with 3-O silk suture    Recommendations:  - Patient should stay overnight in CDU for 3 more doses of IV clindamycin  - Okay for regular diet  - Care as per CDU  - OMFS will round on patient and make further recommendations for discharge vs admission

## 2018-06-17 NOTE — CONSULT NOTE ADULT - SUBJECTIVE AND OBJECTIVE BOX
Note to follow HPI:  67y Male with history of HTN and HLD presents to [facility] for right sided facial swelling. Patient reports that he had extraction of upper right impacted wisdom tooth about 5 weeks ago. Post-extraction, he was given antibiotics. However, 2 weeks post-extraction, patient experienced increased pain and progressive swelling. Swelling and pain worsened severely about 4 days ago. He presented today with 6/10 pain, worsened with palpation.     PMH: HTN, HLD  PSH: denies  Meds:     Vital Signs Last 24 Hrs  T(C): 36.7 (17 Jun 2018 00:40), Max: 36.7 (16 Jun 2018 17:12)  T(F): 98.1 (17 Jun 2018 00:40), Max: 98.1 (16 Jun 2018 17:12)  HR: 78 (17 Jun 2018 00:40) (72 - 94)  BP: 146/90 (17 Jun 2018 00:40) (130/95 - 147/106)  BP(mean): --  RR: 17 (17 Jun 2018 00:40) (17 - 18)  SpO2: 98% (17 Jun 2018 00:40) (96% - 100%)    PE:   Gen: AAOx3, NAD  EOE: b/l midface edema and mandibular edema, (  ) V3 paresthesia  IOE: Occlusion stable and reproducible, gingiva pink and perfused, elastics intact.  Sutures C/D/I.  Wounds hemostatic.                            14.7   12.94 )-----------( 182      ( 16 Jun 2018 18:11 )             42.4     06-16    132<L>  |  87<L>  |  11  ----------------------------<  127<H>  3.5   |  27  |  0.89    Ca    9.8      16 Jun 2018 18:11    TPro  8.4<H>  /  Alb  4.5  /  TBili  0.8  /  DBili  x   /  AST  19  /  ALT  17  /  AlkPhos  71  06-16    I&O's Summary        A/P: 67y Male s/p __. Patient recovering well.  - Continue abx  - Continue pain control  - Encourage PO fluids, voiding, ambulation.  - DVT PPX HPI:  67y Male with history of HTN and HLD presents to [facility] for right sided facial swelling. Patient reports that he had extraction of upper right impacted wisdom tooth about 5 weeks ago. Post-extraction, he was given antibiotics. However, 2 weeks post-extraction, patient experienced increased pain and progressive swelling. Swelling and pain worsened severely about 4 days ago. He presented today with 6/10 pain, worsened with palpation. Patient denies f/c/n/v, odynophagia or dysphagia.    PMH: HTN, HLD  PSH: denies  Meds: Clindamycin 150mg q6h  All: aspirin, penicillin  *patient reports adverse reaction (extended period of palpitation and anxiety with epinephrine in local anesthetic)    Vital Signs Last 24 Hrs  T(C): 36.7 (17 Jun 2018 00:40), Max: 36.7 (16 Jun 2018 17:12)  T(F): 98.1 (17 Jun 2018 00:40), Max: 98.1 (16 Jun 2018 17:12)  HR: 78 (17 Jun 2018 00:40) (72 - 94)  BP: 146/90 (17 Jun 2018 00:40) (130/95 - 147/106)  BP(mean): --  RR: 17 (17 Jun 2018 00:40) (17 - 18)  SpO2: 98% (17 Jun 2018 00:40) (96% - 100%)    PE:   Gen: AAOx3, NAD  EOE: right posterior maxillary swelling overlying right parotid, no LAD  IOE: Occlusion stable and reproducible, generalized good OH, no teeth with obvious gross caries, upper right root tip not appreciated  GIO: 15mm    Pan: multilocular radiopaque lesion of left maxillary sinus, CBI #16 (lesion closely associated with #16), retained root tip at site #1 (upper right)  CT scan:   Multiloculated abscess in the right centered in the right lateral   pterygoid muscle measuring approximately 3.2 x 1.8 x 2.2 cm.  Thickening   of the right masseter and right medial pterygoid muscles compatible with   spread of infection.  Free fluid and inflammatory change tracking into   the right mandibular space.  Overlying cellulitis in the right cheek.                          14.7   12.94 )-----------( 182      ( 16 Jun 2018 18:11 )             42.4     06-16    132<L>  |  87<L>  |  11  ----------------------------<  127<H>  3.5   |  27  |  0.89    Ca    9.8      16 Jun 2018 18:11    TPro  8.4<H>  /  Alb  4.5  /  TBili  0.8  /  DBili  x   /  AST  19  /  ALT  17  /  AlkPhos  71  06-16

## 2018-06-18 PROBLEM — Z00.00 ENCOUNTER FOR PREVENTIVE HEALTH EXAMINATION: Status: ACTIVE | Noted: 2018-06-18

## 2018-06-19 LAB — CULTURE RESULTS: SIGNIFICANT CHANGE UP

## 2018-06-20 LAB — CULTURE RESULTS: SIGNIFICANT CHANGE UP

## 2018-07-30 ENCOUNTER — APPOINTMENT (OUTPATIENT)
Dept: OTOLARYNGOLOGY | Facility: CLINIC | Age: 68
End: 2018-07-30
Payer: MEDICARE

## 2018-07-30 VITALS
HEART RATE: 71 BPM | HEIGHT: 67 IN | OXYGEN SATURATION: 98 % | DIASTOLIC BLOOD PRESSURE: 78 MMHG | TEMPERATURE: 98.4 F | SYSTOLIC BLOOD PRESSURE: 122 MMHG | BODY MASS INDEX: 29.82 KG/M2 | WEIGHT: 190 LBS

## 2018-07-30 DIAGNOSIS — Z78.9 OTHER SPECIFIED HEALTH STATUS: ICD-10-CM

## 2018-07-30 DIAGNOSIS — I10 ESSENTIAL (PRIMARY) HYPERTENSION: ICD-10-CM

## 2018-07-30 DIAGNOSIS — R25.2 CRAMP AND SPASM: ICD-10-CM

## 2018-07-30 DIAGNOSIS — J34.2 DEVIATED NASAL SEPTUM: ICD-10-CM

## 2018-07-30 DIAGNOSIS — J32.0 CHRONIC MAXILLARY SINUSITIS: ICD-10-CM

## 2018-07-30 DIAGNOSIS — Z87.891 PERSONAL HISTORY OF NICOTINE DEPENDENCE: ICD-10-CM

## 2018-07-30 DIAGNOSIS — Z82.49 FAMILY HISTORY OF ISCHEMIC HEART DISEASE AND OTHER DISEASES OF THE CIRCULATORY SYSTEM: ICD-10-CM

## 2018-07-30 DIAGNOSIS — J39.0 RETROPHARYNGEAL AND PARAPHARYNGEAL ABSCESS: ICD-10-CM

## 2018-07-30 DIAGNOSIS — E78.00 PURE HYPERCHOLESTEROLEMIA, UNSPECIFIED: ICD-10-CM

## 2018-07-30 PROCEDURE — 99204 OFFICE O/P NEW MOD 45 MIN: CPT

## 2018-07-30 RX ORDER — EZETIMIBE 10 MG/1
10 TABLET ORAL
Refills: 0 | Status: ACTIVE | COMMUNITY

## 2018-07-30 RX ORDER — ROSUVASTATIN CALCIUM 20 MG/1
20 TABLET, FILM COATED ORAL
Refills: 0 | Status: ACTIVE | COMMUNITY

## 2018-07-30 RX ORDER — VALSARTAN AND HYDROCHLOROTHIAZIDE 160; 25 MG/1; MG/1
160-25 TABLET, FILM COATED ORAL
Refills: 0 | Status: ACTIVE | COMMUNITY

## 2018-07-30 RX ORDER — AMLODIPINE BESYLATE 5 MG/1
5 TABLET ORAL
Refills: 0 | Status: ACTIVE | COMMUNITY

## 2018-07-30 RX ORDER — CETIRIZINE HYDROCHLORIDE 10 MG/1
10 CAPSULE, LIQUID FILLED ORAL
Refills: 0 | Status: ACTIVE | COMMUNITY

## 2018-07-31 PROBLEM — I10 ESSENTIAL (PRIMARY) HYPERTENSION: Chronic | Status: ACTIVE | Noted: 2018-06-16

## 2018-08-02 ENCOUNTER — FORM ENCOUNTER (OUTPATIENT)
Age: 68
End: 2018-08-02

## 2018-08-03 ENCOUNTER — APPOINTMENT (OUTPATIENT)
Dept: CT IMAGING | Facility: IMAGING CENTER | Age: 68
End: 2018-08-03
Payer: MEDICARE

## 2018-08-03 ENCOUNTER — OUTPATIENT (OUTPATIENT)
Dept: OUTPATIENT SERVICES | Facility: HOSPITAL | Age: 68
LOS: 1 days | End: 2018-08-03
Payer: MEDICARE

## 2018-08-03 DIAGNOSIS — J32.0 CHRONIC MAXILLARY SINUSITIS: ICD-10-CM

## 2018-08-03 DIAGNOSIS — J39.0 RETROPHARYNGEAL AND PARAPHARYNGEAL ABSCESS: ICD-10-CM

## 2018-08-03 PROCEDURE — 82565 ASSAY OF CREATININE: CPT

## 2018-08-03 PROCEDURE — 70487 CT MAXILLOFACIAL W/DYE: CPT | Mod: 26

## 2018-08-03 PROCEDURE — 70487 CT MAXILLOFACIAL W/DYE: CPT

## 2018-08-10 ENCOUNTER — MESSAGE (OUTPATIENT)
Age: 68
End: 2018-08-10

## 2018-08-10 RX ORDER — LEVOFLOXACIN 500 MG/1
500 TABLET, FILM COATED ORAL DAILY
Qty: 10 | Refills: 0 | Status: ACTIVE | COMMUNITY
Start: 2018-08-10 | End: 1900-01-01

## 2019-05-11 NOTE — ED ADULT NURSE NOTE - NSHISCREENINGQ1_ED_A_ED
Patient was seen this morning by orthopedics and plan is to discharge home. I did review his telemetry. He remained in a sinus rhythm all night. He says he feels well this morning he denies any chest pain or shortness of breath. No further recommendations from my standpoint. He can follow-up with his primary doctors as previously scheduled.    Signed:  Adonis Live DO  5/11/2019  9:08 AM    
No

## 2023-11-08 ENCOUNTER — APPOINTMENT (OUTPATIENT)
Age: 73
End: 2023-11-08

## 2023-11-08 ENCOUNTER — APPOINTMENT (OUTPATIENT)
Age: 73
End: 2023-11-08
Payer: SELF-PAY

## 2023-11-08 PROCEDURE — D1110 PROPHYLAXIS - ADULT: CPT

## 2024-03-07 ENCOUNTER — APPOINTMENT (OUTPATIENT)
Age: 74
End: 2024-03-07
Payer: SELF-PAY

## 2024-03-07 PROCEDURE — D0140: CPT

## 2024-03-07 PROCEDURE — D0220: CPT

## 2024-03-13 ENCOUNTER — APPOINTMENT (OUTPATIENT)
Age: 74
End: 2024-03-13
Payer: SELF-PAY

## 2024-03-13 PROCEDURE — D1110 PROPHYLAXIS - ADULT: CPT

## 2024-03-13 PROCEDURE — D0120: CPT

## 2024-03-13 PROCEDURE — D0274: CPT

## 2024-03-21 ENCOUNTER — APPOINTMENT (OUTPATIENT)
Age: 74
End: 2024-03-21

## 2024-04-08 ENCOUNTER — APPOINTMENT (OUTPATIENT)
Age: 74
End: 2024-04-08

## 2024-04-11 ENCOUNTER — APPOINTMENT (OUTPATIENT)
Age: 74
End: 2024-04-11
Payer: SELF-PAY

## 2024-04-11 PROCEDURE — D9944: CPT

## 2024-07-10 ENCOUNTER — APPOINTMENT (OUTPATIENT)
Age: 74
End: 2024-07-10
Payer: SELF-PAY

## 2024-07-10 PROCEDURE — D1110 PROPHYLAXIS - ADULT: CPT

## 2024-11-27 ENCOUNTER — APPOINTMENT (OUTPATIENT)
Age: 74
End: 2024-11-27
Payer: SELF-PAY

## 2024-11-27 PROCEDURE — D1110 PROPHYLAXIS - ADULT: CPT

## 2024-11-27 PROCEDURE — D0120: CPT

## 2025-04-02 ENCOUNTER — APPOINTMENT (OUTPATIENT)
Age: 75
End: 2025-04-02

## 2025-04-02 PROCEDURE — D1110 PROPHYLAXIS - ADULT: CPT

## 2025-04-23 NOTE — ED CDU PROVIDER INITIAL DAY NOTE - MUSCULOSKELETAL, MLM
Spine appears normal, range of motion is not limited, no muscle or joint tenderness

## 2025-07-28 ENCOUNTER — NON-APPOINTMENT (OUTPATIENT)
Age: 75
End: 2025-07-28

## 2025-07-29 ENCOUNTER — APPOINTMENT (OUTPATIENT)
Dept: OTOLARYNGOLOGY | Facility: CLINIC | Age: 75
End: 2025-07-29
Payer: MEDICARE

## 2025-07-29 ENCOUNTER — NON-APPOINTMENT (OUTPATIENT)
Age: 75
End: 2025-07-29

## 2025-07-29 DIAGNOSIS — M26.652 ARTHROPATHY OF LEFT TEMPOROMANDIBULAR JOINT: ICD-10-CM

## 2025-07-29 DIAGNOSIS — H93.8X3 OTHER SPECIFIED DISORDERS OF EAR, BILATERAL: ICD-10-CM

## 2025-07-29 DIAGNOSIS — H90.3 SENSORINEURAL HEARING LOSS, BILATERAL: ICD-10-CM

## 2025-07-29 PROCEDURE — 99203 OFFICE O/P NEW LOW 30 MIN: CPT

## 2025-07-29 PROCEDURE — 92567 TYMPANOMETRY: CPT

## 2025-07-29 PROCEDURE — 92557 COMPREHENSIVE HEARING TEST: CPT

## 2025-08-07 ENCOUNTER — APPOINTMENT (OUTPATIENT)
Age: 75
End: 2025-08-07

## 2025-08-07 ENCOUNTER — APPOINTMENT (OUTPATIENT)
Age: 75
End: 2025-08-07
Payer: SELF-PAY

## 2025-08-07 PROCEDURE — D0120: CPT

## 2025-08-07 PROCEDURE — D0220: CPT

## 2025-08-07 PROCEDURE — D1110 PROPHYLAXIS - ADULT: CPT

## 2025-08-07 PROCEDURE — D0272: CPT
